# Patient Record
Sex: FEMALE | Race: OTHER
[De-identification: names, ages, dates, MRNs, and addresses within clinical notes are randomized per-mention and may not be internally consistent; named-entity substitution may affect disease eponyms.]

---

## 2021-03-15 ENCOUNTER — HOSPITAL ENCOUNTER (INPATIENT)
Dept: HOSPITAL 76 - WFO | Age: 31
LOS: 2 days | Discharge: HOME | End: 2021-03-17
Attending: ADVANCED PRACTICE MIDWIFE | Admitting: ADVANCED PRACTICE MIDWIFE
Payer: COMMERCIAL

## 2021-03-15 DIAGNOSIS — Z3A.40: ICD-10-CM

## 2021-03-15 DIAGNOSIS — Z20.822: ICD-10-CM

## 2021-03-15 DIAGNOSIS — B96.20: ICD-10-CM

## 2021-03-15 LAB
BASOPHILS NFR BLD AUTO: 0 10^3/UL (ref 0–0.1)
BASOPHILS NFR BLD AUTO: 0.3 %
CLARITY UR REFRACT.AUTO: CLEAR
EOSINOPHIL # BLD AUTO: 0 10^3/UL (ref 0–0.7)
EOSINOPHIL NFR BLD AUTO: 0.4 %
ERYTHROCYTE [DISTWIDTH] IN BLOOD BY AUTOMATED COUNT: 13 % (ref 12–15)
GLUCOSE UR QL STRIP.AUTO: NEGATIVE MG/DL
HCT VFR BLD AUTO: 35.2 % (ref 37–47)
HGB UR QL STRIP: 11.9 G/DL (ref 12–16)
KETONES UR QL STRIP.AUTO: NEGATIVE MG/DL
LYMPHOCYTES # SPEC AUTO: 1.7 10^3/UL (ref 1.5–3.5)
LYMPHOCYTES NFR BLD AUTO: 23.1 %
MCH RBC QN AUTO: 31.2 PG (ref 27–31)
MCHC RBC AUTO-ENTMCNC: 33.8 G/DL (ref 32–36)
MCV RBC AUTO: 92.1 FL (ref 81–99)
MONOCYTES # BLD AUTO: 0.5 10^3/UL (ref 0–1)
MONOCYTES NFR BLD AUTO: 7 %
MUCOUS THREADS URNS QL MICRO: (no result)
NEUTROPHILS # BLD AUTO: 5.1 10^3/UL (ref 1.5–6.6)
NEUTROPHILS # SNV AUTO: 7.3 X10^3/UL (ref 4.8–10.8)
NEUTROPHILS NFR BLD AUTO: 68.9 %
NITRITE UR QL STRIP.AUTO: NEGATIVE
NRBC # BLD AUTO: 0 /100WBC
NRBC # BLD AUTO: 0 X10^3/UL
PDW BLD AUTO: 10.7 FL (ref 7.9–10.8)
PH UR STRIP.AUTO: 6 PH (ref 5–7.5)
PLATELET # BLD: 243 10^3/UL (ref 130–450)
PROT UR STRIP.AUTO-MCNC: NEGATIVE MG/DL
RBC # UR STRIP.AUTO: (no result) /UL
RBC # URNS HPF: (no result) /HPF (ref 0–5)
RBC MAR: 3.82 10^6/UL (ref 4.2–5.4)
SP GR UR STRIP.AUTO: 1.02 (ref 1–1.03)
SQUAMOUS URNS QL MICRO: (no result)
UROBILINOGEN UR QL STRIP.AUTO: (no result) E.U./DL
UROBILINOGEN UR STRIP.AUTO-MCNC: NEGATIVE MG/DL
WBC # UR MANUAL: (no result) /HPF (ref 0–5)

## 2021-03-15 PROCEDURE — 85025 COMPLETE CBC W/AUTO DIFF WBC: CPT

## 2021-03-15 PROCEDURE — 87635 SARS-COV-2 COVID-19 AMP PRB: CPT

## 2021-03-15 PROCEDURE — 96365 THER/PROPH/DIAG IV INF INIT: CPT

## 2021-03-15 PROCEDURE — 96367 TX/PROPH/DG ADDL SEQ IV INF: CPT

## 2021-03-15 PROCEDURE — 87181 SC STD AGAR DILUTION PER AGT: CPT

## 2021-03-15 PROCEDURE — 81001 URINALYSIS AUTO W/SCOPE: CPT

## 2021-03-15 PROCEDURE — 86900 BLOOD TYPING SEROLOGIC ABO: CPT

## 2021-03-15 PROCEDURE — 86901 BLOOD TYPING SEROLOGIC RH(D): CPT

## 2021-03-15 PROCEDURE — 86850 RBC ANTIBODY SCREEN: CPT

## 2021-03-15 PROCEDURE — 87086 URINE CULTURE/COLONY COUNT: CPT

## 2021-03-15 NOTE — HISTORY & PHYSICAL EXAMINATION
Prenatal Admit History





- Visit Reason


Visit Reason: Other (Elective IOL)





- Pregnancy


: 5


Parity: 3


Premature: 0


Ectopic: 0


: 1


Prenatal Care: positive: LIDA-All, None (Transfer to [facility] at 35.6wks)


Prenatal Risk/History: positive: None


Pregnancy Complications This Pregnancy: positive: Treated for GBS/UTI (UTI (non 

gbs) treated at 36.4wks)


Smoking Status: Never smoker





- Mother's Labs


Mother's Blood Type: positive: O


Mother's RH: positive: Positive


GBS: positive: Group B Step Negative


Rubella Status: positive: Immune





- Other Maternal History


Other Maternal History: 





 31yo  at 40.3wks gestation who presents to labor and delivery with 

desire for pre-induction cervical ripening. 


 Contractions none


 Reports fetal movement


 Denies VB/LOF


 Does report mild CVA tenderness on her right side, with a history of UTI 

treated with macrobid at 36.4wks. Denies other s/s UTI


 Prenatal Care- with [facility] to 35.6 weeks then transferred to [facility]


 Pregnancy complications


    None


 Dating Criteria 


    Initial U/S: 11.0wks c/w LMP for COLT 2021


 OB Hx 


    G1: . . 39wks. Male 7#


    G2: . . 40wks. Female 7#


    G3: SAB 6wks


    G4: . . 39wks. Male 6#


 Medications


    PNV


 Allergies


    NKA


 Medical History


    None


 Surgical History


    [place] Teeth Extraction


 Family History


    Non contributory


 Social History


    Non contributory


 Prenatal Labs


Initial U/S:  at [facility] at 11.0wks c/w LMP for final COLT 2021


O pos/Rubella  immune


VZV immune


Gentic testing: not done, in [facility] chart as "too late to draw"


FAS: Anterior placenta. ELLIE wnl, efw 43%


f/u cardiac-wnl


Glucola: 103 


Flu:  


TDAP  : 2021


GBS @ 36.5wks NEG


HSV:  denies self and partner


Breast pump Rx : 2020 at [facility]


MOD: .  James. Baby BOY: [employer]. has 3 NSVDs, first tear, rest 

intact. .


pp contraception:DESIRES BTL- consent in chart


PAP: 08/21/20-nilm





 SVE  on admit /-2/post/mod


 Vertex by digital exam and Leopold's


 EFW by Leopold's- 7.5#


 UA- collected and sent








 Assessment


    31yo  at 40.3wks gestation presents for elective IOL


    Uterine activity- none


    Robertson score 4 -requires cervical ripening


    FHT Cat I


 Plan


    Admit to Labor and Delivery- observation status


    Cervical ripening with Misoprostol q4h


    Antibiotic therapy if indicated for UTI


    Monitoring- Continuous


    Comfort measures available- whirlpool tub, fentanyl, epidural, N2O


    Diet/Activity- per pt preference


    Anticipate 











Review of Systems





- Genitourinary


Genitourinary: reports: Flank pain (mild tenderness)





- All Other Systems


All Other Systems: reports: Reviewed and negative





Physical





- Abdominal Exam


Contraction Frequency (min/apart): mild to none


Contraction Intensity: positive: Mild


Uterine Resting Tone: positive: Soft





- Fetal Monitoring


Fetal Strip Review: positive: Category I





- Presentation


Presentation: positive: Vertex





- Vaginal Exam


Membranes: positive: Membranes intact


Dilation (in cm): 2


Effacement (%): 50


Station: positive: -2


Cervical Position: positive: Posterior





Plan for Labor





- Plan For Labor


I expect patient to be DC'd or transferred within 96 hours.: Yes Laceration to left finger from kitchen knife today.

## 2021-03-15 NOTE — ANESTHESIA
Pre-Anesthesia VS, & Labs





- Diagnosis





active labor





- Procedure





labor epidural


Vital Signs: 





                                        











Temp Pulse Resp BP Pulse Ox


 


 36.8 C   88   16   130/83 H   


 


 03/15/21 10:53  03/15/21 10:53  03/15/21 10:53  03/15/21 10:53   











Height: 5 ft 3 in


Weight (kg): 62.414 kg


Body Mass Index: 24.3


BMI Classification: Healthy weight





- Pregnancy


Is Patient Pregnant?: Yes





- Lab Results


Current Lab Results: 





Laboratory Tests





03/15/21 13:00: Blood Type O POSITIVE, Antibody Screen NEGATIVE


03/15/21 13:00: WBC 7.3, RBC 3.82 L, Hgb 11.9 L, Hct 35.2 L, MCV 92.1, MCH 31.2 

H, MCHC 33.8, RDW 13.0, Plt Count 243, MPV 10.7, Neut # (Auto) 5.1, Lymph # 

(Auto) 1.7, Mono # (Auto) 0.5, Eos # (Auto) 0.0, Baso # (Auto) 0.0, Absolute 

Nucleated RBC 0.00, Nucleated RBC % 0.0








Fish Bones: 


                                 03/15/21 13:00








Home Medications and Allergies





Active Medications





Acetaminophen (Acetaminophen 325 Mg Tablet)  650 mg PO Q6H PRN


   PRN Reason: Pain or Fever


Carboprost Tromethamine (Carboprost Tromethamine 250 Mcg/Ml Amp)  250 mcg IM 

Q15M PRN


   PRN Reason: Step 4: Hemorrhage protocol


   Stop: 21 13:22


Cephalexin (Cephalexin 250 Mg Capsule)  500 mg PO Q6HR LILA


Fentanyl (Fentanyl 100 Mcg/2 Ml Vial)  50 mcg IVP Q1H PRN


   PRN Reason: PAIN


Lactated Ringer's (Lr)  1,000 mls @ 100 mls/hr IV .Q10H LILA


   Last Infusion: 03/15/21 17:56 Dose:  0 mls/hr


   Documented by: 


Oxytocin/Sodium Chloride (Pitocin/Sodium Chloride)  500 mls @ 999 mls/hr IV PRN 

PRN; Protocol


   PRN Reason: POST-PARTUM HEMORR PREVENTION


   Stop: 21 13:22


   Last Admin: 03/15/21 18:37 Dose:  1 milliunit/min, 1 mls/hr


   Documented by: 


Tranexamic Acid (Tranexamic 1,000 Mg/100ml-Nacl)  1,000 mg in 100 mls @ 600 

mls/hr IV .ONCE PRN


   PRN Reason: EBL >1200mL and within 3hr


   Stop: 21 13:22


Oxytocin/Sodium Chloride (Pitocin/Sodium Chloride)  500 mls @ 1 mls/hr IV TITR 

LILA; Protocol


Lidocaine HCl (Lidocaine-Mpf 1% 30 Ml Vial)  30 ml ID .ONCE PRN


   PRN Reason: PERINEAL REPAIR


   Stop: 21 13:22


Methylergonovine Maleate (Methylergonovine 0.2 Mg/Ml Vial)  0.2 mg IM .ONCE PRN


   PRN Reason: Step 2: Hemorrhage protocol


   Stop: 21 13:22


Metoclopramide HCl (Metoclopramide 10 Mg Tablet)  10 mg PO Q6H PRN


   PRN Reason: Nausea / Vomiting


Misoprostol (Misoprostol 200 Mcg Tablet)  800 mcg BC .ONCE PRN


   PRN Reason: Step 3: Hemorrhage protocol


   Stop: 21 13:22


Misoprostol (Misoprostol 100 Mcg Tablet)  50 mcg BC Q4HR LILA


   Last Admin: 03/15/21 18:22 Dose:  Not Given


   Documented by: 


Ondansetron HCl (Ondansetron 4 Mg/2 Ml Vial)  4 mg IVP Q4HR PRN


   PRN Reason: Nausea / Vomiting


Oxytocin (Oxytocin 10 Unit/Ml Vial)  10 unit IM .ONCE PRN


   PRN Reason: Step one: If no IV access


   Stop: 21 13:22


Promethazine HCl (Promethazine 25 Mg Tablet)  25 mg PO Q6H PRN


   PRN Reason: Nausea / Vomiting


Sodium Chloride (Sodium Chloride Flush 0.9% 10 Ml Syringe)  10 ml IVP 

0100,0900,1700 LILA


Sodium Chloride (Sodium Chloride Flush 0.9% 10 Ml Syringe)  10 ml IVP PRN PRN


   PRN Reason: AS NEEDED PER PROVIDER ORDERS








Allergies/Adverse Reactions: 


                                    Allergies











Allergy/AdvReac Type Severity Reaction Status Date / Time


 


No Known Drug Allergies Allergy   Verified 03/15/21 18:21














Anes History & Medical History





- Anesthetic History


Anesthesia Complications: reports: No previous complications


Family history of Anesthesia Complications: Denies


Family history of Malignant Hyperthermia: Denies





- Medical History


Smoking Status: Never smoker





- Obstetrical History


: 5


Parity: 3


Prenatal Events: positive: None


Pregnancy Complications: positive: Treated for GBS/UTI (UTI (non gbs) treated at

36.4wks)





Exam


General: Alert, Oriented x3, Cooperative


Dental: WNL


Mouth Opening: 3 Fingerbreadth


Neck Mobility: Normal


Mallampati classification: I





Plan


Anesthesia Type: Epidural


Consent for Procedure(s) Verified and Reviewed: Yes


Code Status: Attempt Resuscitation


ASA classification: 2-Mild systemic disease


Is this case an emergency?: No

## 2021-03-15 NOTE — PROVIDER PROGRESS NOTE
Subjective





- Subjective


Subjective: 





Called as I was on the floor and VICKIE was out of the facility for a decel.  

Lasted 7min, amy 70, resolved with position changes.  SVE unchanged at 2cm.  

Ballotable. Was getting ready to give terbutaline when the FHTs normalized.  P3 

[facility] patient at 40w undergoing elective IOL, misoprostol given 4h prior, feeling 

some cramping but no significant labor contractions.  Tracing prior to decel was

category 1.  Tracing after was as well.  Likely a period of cord compression 

resolved with position changes.  Will continue the continuous monitoring.  No 

further miso will be given.  Report given to VICKIE Felix on her arrival.  





Objective





- Vital Signs/Intake & Output


Vital Signs: 


                                Vital Signs x48h











  Temp Pulse Resp BP


 


 03/15/21 10:53  98.2 F  88  16  130/83 H











Intake & Output: 


                                 Intake & Output











 03/12/21 03/13/21 03/14/21 03/15/21





 22:59 22:59 23:59 23:59


 


Intake Total    166.667


 


Balance    166.667














- Lab Results


Fish Bones: 


                                 03/15/21 13:00





Other Labs: 


                               Lab Results x24hrs











  03/15/21 03/15/21 03/15/21 Range/Units





  15:00 13:00 13:00 


 


WBC    7.3  (4.8-10.8)  x10^3/uL


 


RBC    3.82 L  (4.20-5.40)  10^6/uL


 


Hgb    11.9 L  (12.0-16.0)  g/dL


 


Hct    35.2 L  (37.0-47.0)  %


 


MCV    92.1  (81.0-99.0)  fL


 


MCH    31.2 H  (27.0-31.0)  pg


 


MCHC    33.8  (32.0-36.0)  g/dL


 


RDW    13.0  (12.0-15.0)  %


 


Plt Count    243  (130-450)  10^3/uL


 


MPV    10.7  (7.9-10.8)  fL


 


Neut # (Auto)    5.1  (1.5-6.6)  10^3/uL


 


Lymph # (Auto)    1.7  (1.5-3.5)  10^3/uL


 


Mono # (Auto)    0.5  (0.0-1.0)  10^3/uL


 


Eos # (Auto)    0.0  (0.0-0.7)  10^3/uL


 


Baso # (Auto)    0.0  (0.0-0.1)  10^3/uL


 


Absolute Nucleated RBC    0.00  x10^3/uL


 


Nucleated RBC %    0.0  /100WBC


 


Urine Color  YELLOW    


 


Urine Clarity  CLEAR    (CLEAR)  


 


Urine pH  6.0    (5.0-7.5)  PH


 


Ur Specific Gravity  1.020    (1.002-1.030)  


 


Urine Protein  NEGATIVE    (NEGATIVE)  mg/dL


 


Urine Glucose (UA)  NEGATIVE    (NEGATIVE)  mg/dL


 


Urine Ketones  NEGATIVE    (NEGATIVE)  mg/dL


 


Urine Occult Blood  TRACE-INTA    (NEGATIVE)  


 


Urine Nitrite  NEGATIVE    (NEGATIVE)  


 


Urine Bilirubin  NEGATIVE    (NEGATIVE)  


 


Urine Urobilinogen  0.2 (NORMAL)    (NORMAL)  E.U./dL


 


Ur Leukocyte Esterase  NEGATIVE    (NEGATIVE)  


 


Urine RBC  0-5    (0-5)  /HPF


 


Urine WBC  0-3    (0-5)  /HPF


 


Ur Squamous Epith Cells  FEW Squamous    (<= Few)  


 


Urine Bacteria  Moderate H    (None Seen)  /HPF


 


Urine Mucus  Few Strands    


 


Urine Culture Comments  INDICATED    


 


Blood Type   O POSITIVE   


 


Antibody Screen   NEGATIVE

## 2021-03-16 PROCEDURE — 10907ZC DRAINAGE OF AMNIOTIC FLUID, THERAPEUTIC FROM PRODUCTS OF CONCEPTION, VIA NATURAL OR ARTIFICIAL OPENING: ICD-10-PCS | Performed by: ADVANCED PRACTICE MIDWIFE

## 2021-03-16 RX ADMIN — IBUPROFEN SCH MG: 800 TABLET, FILM COATED ORAL at 18:56

## 2021-03-16 RX ADMIN — ACETAMINOPHEN PRN MG: 500 TABLET ORAL at 19:56

## 2021-03-16 RX ADMIN — ACETAMINOPHEN PRN MG: 500 TABLET ORAL at 12:09

## 2021-03-16 RX ADMIN — DOCUSATE SODIUM SCH MG: 100 CAPSULE, LIQUID FILLED ORAL at 21:01

## 2021-03-16 RX ADMIN — ONDANSETRON PRN MG: 2 INJECTION INTRAMUSCULAR; INTRAVENOUS at 03:07

## 2021-03-16 RX ADMIN — IBUPROFEN SCH MG: 800 TABLET, FILM COATED ORAL at 10:51

## 2021-03-16 RX ADMIN — ONDANSETRON PRN MG: 2 INJECTION INTRAMUSCULAR; INTRAVENOUS at 07:21

## 2021-03-16 NOTE — PROVIDER PROGRESS NOTE
Labor Progress Note





- Uterine Monitoring


Uterine Monitoring Mode: positive: External toco


Contraction Frequency (min/apart): 4-5


Contraction Intensity: positive: Moderate to strong


Uterine Resting Tone: positive: Soft





- Fetal Monitoring


Fetal Monitor Mode: positive: External ultrasound


Fetal Heart Rate Baseline: 115


Fetal Heart Rate Variability: positive: Moderate (6-25 bmp)


Fetal Accelerations: positive: Present, 15x15


Fetal Decelerations: positive: None





- Vaginal Exam


Dilation (in cm): 6


Effacement (%): 80


Station: -2


Cervical Position: Midposition





- Labor Progress Note


Labor Progress Note/Additional Text: 





S: Comfortable with epidural. Feeling ready to have a baby.


O: FHR baseline 115, moderate variability, + accels, no decels at present


SVE 6/80/-2, midposition, soft. Vertex


AROM at 0613 for moderate amount of clear fluid


A: 29yo  @ 40.4wks gestation


GBS neg


Keflex PO q 6hr for UTI


FHR Category I


P: Continuous fetal monitoring


Maintain epidural for pain management


Anticipate

## 2021-03-16 NOTE — PROVIDER PROGRESS NOTE
Labor Progress Note





- Uterine Monitoring


Uterine Monitoring Mode: positive: External toco


Contraction Frequency (min/apart): 2-4


Contraction Intensity: positive: Mild to moderate





- Fetal Monitoring


Fetal Monitor Mode: positive: External ultrasound


Fetal Heart Rate Baseline: 120


Fetal Heart Rate Variability: positive: Moderate (6-25 bmp)


Fetal Accelerations: positive: Present, 15x15


Fetal Decelerations: positive: None


Fetal Strip Review: positive: Category I





- Vaginal Exam


Dilation (in cm): 4


Effacement (%): 80


Station: -2


Cervical Position: Anterior





- Labor Progress Note


Labor Progress Note/Additional Text: 





S:


Donna is resting in bed in the throne position,  supportive at bedside.

She reports a mild headache and mild nausea, which the nurse has administered 

Zofran for. 





O:


After epidural placement at approximately 2330, fetal heart tones began to 

demonstrate subtle and intermittent late decelerations that progressed over the 

next hour to be recurrent late decelerations. Fluid bolus and significant 

position changes utilized. 


SVE at that time was unchanged.


Pitocin was halved, and then turned off. 


Plan of care discussed with patient was to rest for two hours without pitocin 

and then reevaluate.





SVE at approx 0315 was 4/80/-2/soft/ant/intact with bloody show. 


Fetal head no longer well applied to cervix and not a candidate for AROM.





Ancef 2gx1 given, Keflex PO started at approx midnight.





A:


31yo  at 40.4wks gestation with elective IOL


Cat I strip currently


Fetal head not consistently applied to cervix- position changes indicated, 

possible asynclitism.


Uterine contractions- adequate for cervical change


Epidural effective for pain management





P:


Continuous fetal monitoring


Tylenol for HA


Continue Keflex PO Q6h for UTI- awaiting cultures


Observe labor progress and check for cervical change in four hours or sooner if 

indicated


Anticipate

## 2021-03-16 NOTE — DELIVERY NOTE
Delivery Note





- Labor


Labor: positive: Augmented by ARM, Induced by oxytocin





- Infant Delivery Method


Infant Delivery Method: positive: Spontaneous vaginal delivery





- Birth Presentation


Birth Presentation: positive: Vertex, RONIT - right occiput anterior





- Nuchal Cord


Nuchal Cord: positive: Present, Reduced





- Amniotic Fluid Description


Amniotic Fluid Description: positive: Clear





- Episiotomy Type


Episiotomy Type: positive: None





- Laceration


Laceration: positive: None





- Delivery Outcome


Delivery Outcome: positive: Livebirth





- 


[place]: positive: Placed in direct skin contact with mother, Stimulated, 

Warmed, [place] used


 sex: positive: Male





- Cord


Cord: positive: 3 vessels





- Placenta


Placenta: positive: Intact





- Estimated Blood Loss


Estimated Blood Loss (in cc): 200





- Post Delivery Events


Post Delivery Events: positive: No post delivery events





- Delivery Comments (Free Text/Narrative)


Delivery Comments (Free Text/Narrative): 





Labor: This 29yo  @ 40.4wks gestation presented to [facility] on 03/15/2021 

for elective IOL. Upon arrival cervix was 2/50/-2 and vertex. She was given 

pitocin via IV for IOL. Epidural placed per maternal request. She experienced 

recurrent late decelerations and pitocin was turned off. Decelerations resolved 

with fluid bolus and position changes. Patient progressed to 6/80/-2 

spontaneously and AROM occurred at 0613 and was noted to be a moderate amount of

clear fluid. Normal labor course. Patient progressed to c/c/0 at 0857 with onset

of active pushing at 0910.


Birth: Normal  of a viable male infant on 2021 @ 1936. Nuchal cord x 1 

easily reduced. Body cord x 1 delivered through. The  was placed on 

maternal abdomen, stimulated, dried, and placed skin to skin. Apgar's were 8/9 

at 1 and 5 min respectively. Pitocin administered via IV for hemostasis. The 

umbilical cord was allowed to stop pulsating at which time it was doubly clamped

by CNM and cut by FOB. 3VC. Cord blood was obtained. Fundal massage and gentle 

cord traction applied for active management of the third stage. Placenta 

delivered spontaneously and intact @ 0940. [id number]. 


Fourth stage: Uterine fundus firm and there is no excessive bleeding. The 

perineum, vagina, and cervix were inspected and noted to be intact. 

Breastfeeding initiated. Family bonding well. Both mother and baby were left in 

stable condition.

## 2021-03-17 VITALS — DIASTOLIC BLOOD PRESSURE: 67 MMHG | SYSTOLIC BLOOD PRESSURE: 116 MMHG

## 2021-03-17 RX ADMIN — DOCUSATE SODIUM SCH MG: 100 CAPSULE, LIQUID FILLED ORAL at 08:41

## 2021-03-17 RX ADMIN — ACETAMINOPHEN PRN MG: 500 TABLET ORAL at 03:59

## 2021-03-17 RX ADMIN — IBUPROFEN SCH MG: 800 TABLET, FILM COATED ORAL at 00:59

## 2021-03-17 RX ADMIN — IBUPROFEN SCH MG: 800 TABLET, FILM COATED ORAL at 06:50

## 2021-03-17 NOTE — DISCHARGE PLAN
Discharge Plan


Problem Reviewed?: Yes


Disposition: 01 Home, Self Care


Condition: Good


Diet: Regular


No Smoking: If you smoke, Please STOP!  Call [phone] for help.


Follow-up with: 


Clover Washington ARNP [Provider Admit Priv/Credential] -

## 2021-03-17 NOTE — LABOR FLOWSHEET
===================================

Labor Flowsheet

===================================

Datetime Report Generated by CPN: 03/17/2021 11:19

   

   

===========================

Datetime: 03/17/2021 08:34

===========================

   

   

===================================

VITAL SIGNS

===================================

   

 NBP Sys/Temi/Mean (mmHg):  117

:  67

:  79

 Pulse:  67

 LaborFlag:  Labor

   

===========================

Datetime: 03/17/2021 04:04

===========================

   

 SpO2 (%):  99

   

===========================

Datetime: 03/16/2021 10:00

===========================

   

   

===================================

PAIN

===================================

   

 Pain Scale:  0

   

===========================

Datetime: 03/16/2021 09:35

===========================

   

   

===================================

FETAL ASSESSMENT A

===================================

   

 Monitor Mode:  External US

 FHR Baseline Changes:  No Baseline Change

 Variability:  Moderate 6-25 bpm

 Accelerations:  15X15

 Decelerations:  Early; Late; Variable

 Category:  Category II

   

===========================

Datetime: 03/16/2021 09:06

===========================

   

 Patient Care Comments:  O2 applied per pt. request.  A. Bayron aware and ok with pt. getting 10L via m
ask.

   

===================================

COMMUNICATION

===================================

   

 Communication:  Provider at Bedside

   

===========================

Datetime: 03/16/2021 09:00

===========================

   

   

===================================

UTERINE ACTIVITY

===================================

   

 Monitor Mode:  External

 Frequency (min):  2-5

 Quality:  Moderate

 Duration (sec):  

 Pattern:  Normal: <= 5 Contractions in 10 Minutes

 Resting Tone (Palpate):  Relaxed

 Comments:  sterile exam

   

===========================

Datetime: 03/16/2021 08:57

===========================

   

 Pain Presence:  None/Denies

   

===================================

VAGINAL EXAM

===================================

   

 Dilatation (cm):  10.0

 Effacement (%):  100

 Station:  1

 Exam by:  cgambs

 Anesthesia Level Check:  T9

   

===========================

Datetime: 03/16/2021 08:02

===========================

   

 Actions for Fetal Decelerations:  Other (Annotations: position change)

   

===========================

Datetime: 03/16/2021 07:53

===========================

   

 Patient Position/Activity:  High Fowlers

 Hygiene:  Pushpa Care; Linens Changed

   

===========================

Datetime: 03/16/2021 07:40

===========================

   

 Medication Comments:  Ropivicaine hung

   

===========================

Datetime: 03/16/2021 07:29

===========================

   

 Temperature (C):  38.3

   

===========================

Datetime: 03/16/2021 07:21

===========================

   

 Antiemetics/Antacids:  Zofran (mg) @ 4mg

   

===========================

Datetime: 03/16/2021 07:00

===========================

   

 FHR Baseline Rate :  110

   

===========================

Datetime: 03/16/2021 06:12

===========================

   

 Membrane Status:  Ruptured

 Membranes Rupture Method:  Artificial

 Amniotic Fluid Color:  Clear

 Amniotic Fluid Amount:  Small

 Amniotic Fluid Odor:  Normal

 Vaginal Bleeding:  Normal Show

 Cervix, Position:  Midposition

   

===========================

Datetime: 03/16/2021 03:20

===========================

   

 Analgesics/Sedatives:  Tylenol (mg) @ 650

   

===========================

Datetime: 03/16/2021 03:13

===========================

   

 Cervix, Consistency:  Soft

   

===========================

Datetime: 03/16/2021 02:00

===========================

   

   

===================================

MATERNAL ASSESSMENT

===================================

   

 Level of Consciousness:  Drowsy

 Headache:  Denies

 Nausea/Vomiting:  Denies

   

===================================

PATIENT CARE

===================================

   

 IV/Blood Work:  New IV Bag Hung

   

===========================

Datetime: 03/16/2021 01:12

===========================

   

 Communication Comments:  Per Clover [place], CNMW will remain off pitocin for 2hours then recheck 
SVE

   

===========================

Datetime: 03/16/2021 00:54

===========================

   

   

===================================

MEDICATIONS

===================================

   

 Pitocin (milliunits):  Discontinued

   

===========================

Datetime: 03/16/2021 00:15

===========================

   

 Monitor Interventions for UA:  [place] Adjusted

 Pitocin Checklist:  At Least 1 Acceleration of 15 bpm x 15 Seconds in 30 Minutes or Adequate Variabi
lity; No More than 1 Late Deceleration Occurred in Past 30 Minutes; No More than 5 Uterine Contractio
ns in 10 Minutes for any 20 Minute Interval; Uterus Palpates Soft between Contractions

 Monitor Interventions for FHR:  Ultrasound Adjusted

   

===========================

Datetime: 03/16/2021 00:12

===========================

   

 Vaginal Exam Comments:  Unchanged from previous SVE

 I/O Interventions:  Ott Cath Inserted

   

===========================

Datetime: 03/15/2021 23:15

===========================

   

   

===================================

ANESTHESIA

===================================

   

 Anesthesia Plans:  Epidural

 Epidural Procedure:  Completed

   

===========================

Datetime: 03/15/2021 23:03

===========================

   

 Epidural Positioning:  Sitting

   

===========================

Datetime: 03/15/2021 22:20

===========================

   

   

===================================

PROCEDURE TIME OUT

===================================

   

 Procedure Verify:  Correct Patient Identity; Correct Side and Site are Marked; Accurate Procedure Co
nsent Form; Agreement on Procedure to be Done; Correct Patient Position; Addressed Need to Administer
 Antibiotics or Fluids for Irrigation; Safety Precautions Based on Patient History or Medication Use

 Anesthesia Comments:  Marixa Kumar in room for epidural placement. RN at bedside for pt positionin
g

   

===========================

Datetime: 03/15/2021 22:15

===========================

   

 Contraction Comments:  ctx coupling

   

===========================

Datetime: 03/15/2021 22:06

===========================

   

 Notification Reason:  Labor Status

   

===========================

Datetime: 03/15/2021 21:56

===========================

   

 Pain Assessment Comments:  Pt having increased back/pelvic pain with ctx and is requesting epidural

   

===========================

Datetime: 03/15/2021 21:20

===========================

   

 Pain Type:  Cramping; Contraction

   

===========================

Datetime: 03/15/2021 19:18

===========================

   

 Breath Sounds, Left:  Clear and Equal

 Breath Sounds, Right:  Clear and Equal

 RUQ Epigastric Pain:  Denies

   

===================================

TEACHING

===================================

   

 Instructional Method:  Verbal

 Plan of Care:  Plan of Care Discussed

   

===========================

Datetime: 03/15/2021 19:01

===========================

   

 Oxygen Method:  Room Air

   

===========================

Datetime: 03/15/2021 17:48

===========================

   

 Respirations:  20

   

===========================

Datetime: 03/15/2021 17:23

===========================

   

 Provider Notified (Name):  Clover [place] CNM

   

===========================

Datetime: 03/15/2021 16:50

===========================

   

 Antibiotics:  Ancef IV (Gm) @ 2

   

===========================

Datetime: 03/15/2021 13:41

===========================

   

 Cervical Ripening Agents:  Cytotec @ 50 buccal

## 2021-03-17 NOTE — DISCHARGE SUMMARY
Discharge Summary


Condition at Discharge: Good





- HPI


History of Present Illness: 





Admit Date 03/15/2021


Discharge Date 2021


Diagnosis on Admission:


1.   A 29yo  at 40.3 week intrauterine pregnancy


2.   UTI- asymptomatic, positive UA





Diagnosis on Discharge


1.   A 29yo  s/p spontaneous vaginal delivery on 2021


2.   Normal recovery


3.   UTI- culture positive for E. coli; treatment will continue outpatient





Brief History:


She is a patient at [facility] who presented on 03/15/2021 for 

pre-induction cervical ripening for elective induction of labor.  Her cervix was

2cm dilated, 50%effaced, and -2 station. She was ripening with misoprostol and 

was augmented with pitocin and spontaneously delivered a viable male infant 

named , weighing 7# 11oz. Apgars were 8 and 9- and 1 and 5 minutes 

respectively.  mL. The patients perineum was intact.





She has been doing well in her postpartum course. She is ambulating and 

tolerating a regular diet. She is urinating without difficulty and her lochia is

normal. Her pain is well controlled with oral medications.  She will be 

discharged home today on postpartum day #1 prescription for Keflex sent elect

ronically to [facility] per pt request. She intends to follow up with 

Midwifery at [facility] in 1 and 6 weeks for routine postpartum

visit. She has been given precautions to call if she has any worsening fevers, 

chills, abdominal pain, increased bleeding or foul smelling vaginal lochia.








- ALLERGIES


Allergies/Adverse Reactions: 


                                    Allergies











Allergy/AdvReac Type Severity Reaction Status Date / Time


 


No Known Drug Allergies Allergy   Verified 03/15/21 18:21














- LABS


Result Diagrams: 


                                 03/15/21 13:00

## 2021-03-17 NOTE — PROVIDER PROGRESS NOTE
Subjective





- Prog Note Date


Prog Note Date: 21


Prog Note Time: 10:34





- Subjective


Pt reports feeling: Improved


Subjective: 





S:


Donna is resting in bed and holding baby while he gets his Metabolic Screen 

drawn. FOB is supportive at bedside. She reports breastfeeding has been easy, 

much easier to latch that her previous three children. 


She reports her bleeding as about 1/3-1/2 a pad every 2-3 hours when she gets up

to urinate.


Her cramping has been well controlled with PO pain meds.


She strongly desires to go home today





O:


Lochia rubra


Fundus firm





A: 


29yo  s/p  on pp day 1


Normal postpartum recovery





P:


Continue with routine postpartum care


Evaluate for discharge home today


Continue Keflex QID for a total of 7 days








Objective





- Vital Signs/Intake & Output


Vital Signs: 


                                Vital Signs x48h











  Temp Pulse Resp BP Pulse Ox


 


 21 08:36  36.8 C  68  16  116/67  98


 


 21 04:00  36.4 C L  57 L  16  116/66  98











Intake & Output: 


                                 Intake & Output











 03/14/21 03/15/21 03/16/21 03/17/21





 23:59 23:59 23:59 23:59


 


Intake Total  166.667 500 


 


Output Total   1175 


 


Balance  166.667 -675 














- Lab Results


Fish Bones: 


                                 03/15/21 13:00

## 2021-04-02 ENCOUNTER — HOSPITAL ENCOUNTER (OUTPATIENT)
Dept: HOSPITAL 76 - LAB.R | Age: 31
Discharge: HOME | End: 2021-04-02
Attending: ADVANCED PRACTICE MIDWIFE
Payer: COMMERCIAL

## 2021-04-02 DIAGNOSIS — N39.0: Primary | ICD-10-CM

## 2021-04-02 LAB
CLARITY UR REFRACT.AUTO: CLEAR
GLUCOSE UR QL STRIP.AUTO: NEGATIVE MG/DL
KETONES UR QL STRIP.AUTO: NEGATIVE MG/DL
NITRITE UR QL STRIP.AUTO: NEGATIVE
PH UR STRIP.AUTO: 5.5 PH (ref 5–7.5)
PROT UR STRIP.AUTO-MCNC: NEGATIVE MG/DL
RBC # UR STRIP.AUTO: (no result) /UL
RBC # URNS HPF: (no result) /HPF (ref 0–5)
SP GR UR STRIP.AUTO: 1.02 (ref 1–1.03)
SQUAMOUS URNS QL MICRO: (no result)
UROBILINOGEN UR QL STRIP.AUTO: (no result) E.U./DL
UROBILINOGEN UR STRIP.AUTO-MCNC: NEGATIVE MG/DL
WBC # UR MANUAL: (no result) /HPF (ref 0–5)

## 2021-04-02 PROCEDURE — 81001 URINALYSIS AUTO W/SCOPE: CPT

## 2021-04-02 PROCEDURE — 87086 URINE CULTURE/COLONY COUNT: CPT

## 2021-05-21 ENCOUNTER — HOSPITAL ENCOUNTER (OUTPATIENT)
Dept: HOSPITAL 76 - LAB | Age: 31
Discharge: HOME | End: 2021-05-21
Attending: OBSTETRICS & GYNECOLOGY
Payer: COMMERCIAL

## 2021-05-21 DIAGNOSIS — Z01.812: Primary | ICD-10-CM

## 2021-05-21 DIAGNOSIS — Z20.822: ICD-10-CM

## 2021-05-21 LAB
BASOPHILS NFR BLD AUTO: 0 10^3/UL (ref 0–0.1)
BASOPHILS NFR BLD AUTO: 0.4 %
EOSINOPHIL # BLD AUTO: 0 10^3/UL (ref 0–0.7)
EOSINOPHIL NFR BLD AUTO: 0.5 %
ERYTHROCYTE [DISTWIDTH] IN BLOOD BY AUTOMATED COUNT: 12.3 % (ref 12–15)
HCT VFR BLD AUTO: 38.6 % (ref 37–47)
HGB UR QL STRIP: 12.7 G/DL (ref 12–16)
LYMPHOCYTES # SPEC AUTO: 2.3 10^3/UL (ref 1.5–3.5)
LYMPHOCYTES NFR BLD AUTO: 42.2 %
MCH RBC QN AUTO: 29.7 PG (ref 27–31)
MCHC RBC AUTO-ENTMCNC: 32.9 G/DL (ref 32–36)
MCV RBC AUTO: 90.4 FL (ref 81–99)
MONOCYTES # BLD AUTO: 0.3 10^3/UL (ref 0–1)
MONOCYTES NFR BLD AUTO: 6.1 %
NEUTROPHILS # BLD AUTO: 2.8 10^3/UL (ref 1.5–6.6)
NEUTROPHILS # SNV AUTO: 5.5 X10^3/UL (ref 4.8–10.8)
NEUTROPHILS NFR BLD AUTO: 50.6 %
NRBC # BLD AUTO: 0 /100WBC
NRBC # BLD AUTO: 0 X10^3/UL
PDW BLD AUTO: 9.7 FL (ref 7.9–10.8)
PLATELET # BLD: 267 10^3/UL (ref 130–450)
RBC MAR: 4.27 10^6/UL (ref 4.2–5.4)

## 2021-05-21 PROCEDURE — 36415 COLL VENOUS BLD VENIPUNCTURE: CPT

## 2021-05-21 PROCEDURE — 85025 COMPLETE CBC W/AUTO DIFF WBC: CPT

## 2021-05-24 NOTE — HISTORY & PHYSICAL EXAMINATION
HPI





- History of Present Illness


HPI Comment/Other: 





CC:  tubal ligation


HPI:


Pt is here today for a preop consult for a tubal ligation





...................................................................VANCE Retana   May 13, 2021 12:51 PM





The patient is a 30-year-old G5, P4 here for sterilization/preoperative consult.





The patient is a G5, P4 who has had four vaginal deliveries. She recently had a 

delivery about two months ago. [facility]. She is 

interested in permanent sterilization by laparoscopic salpingectomy. She was 

referred for surgical consult today. She has no significant past medical or 

surgical history. She has no history of STIs. No abnormal Pap smear. She is due 

for next Pap smear in . She has used pill and the patch in the past. She is 

confident that she no longer wants to have further pregnancies. She confirms 

that she signed [facility] consent form more than 30 days ago.








Allergies:  


No Known Allergies





Medications:  


KEFLEX 750 MG ORAL CAPSULE (CEPHALEXIN) Take one capsule by mouth four times 

daily for a total of 7 days. First day was 2021.; Route: ORAL


PRE- FORMULA ORAL TABLET (PRENATAL MULTIVIT-MIN-FE-FA) Take one tablet by 

mouth once a day; Route: ORAL





Problems: 


Sterilization counseling (ICD-V25.09) (JMN68-E13.09)


Urinary tract infection (UTI) (ICD-599.0) (ZTY02-O77.0)


Postpartum care of lactating mother (ICD-V24.1) (GHO84-Q71.1)


Pelvic floor instability (ICD-618.89) (RJR01-D86.89)


Inguinal hernia, right, small (ICD-550.90) (LBD27-V67.90)





Family History Summary:


Family History Reviewed: 2021


Family History of Other Cancer for Paternal Grandfather - Entered On: 2021





[facility] Family History Notes: Denies family history of diabetes. No family 

history of hypertension or cardiovascular disease. History of cancer in maternal

uncle and maternal grandfather, not otherwise specified. Father is in good 

health. No information on maternal history.








Social History Summary:


The patient lives in [place] with her  and four children.


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


T: Non.


E: No alcohol while breastfeeding. Previously, two glasses per week.


D: None.


Safe at home.





Social History Reviewed: 2021


Previous Social History: 








Risk Factors-CCC with prev: 





Smoked Tobacco Use:  Never smoker


Smokeless Tobacco Use:  Never


Passive Smoke Exposure:  no





Alcohol Use:  no





Drug Use:  no














Vital Signs:





Patient Profile:   30 Years Old Female


Height:      63 inches


Weight:      122 pounds


BMI:      21.69


BP sittin / 69


Cuff size:   regular





Vitals Entered By: VANCE Retana  (May 13, 2021 12:51 PM)





Meds Reviewed: Done


Allergies Reviewed: Done


No known allergies: T








Pregnancy Questionnaire 


Would you like to become pregnant in the next year?  No


Are you currently using contraception? Yes


Are you satisfied with your current birth control? No


Education provided to patient?  Yes


Completed by: Stephanie Alejandro MD (May 15, 2021 12:26 PM)


What is your current type of birth control? Female sterilization


End Method: Female sterilization








Past Medical History:


   Reviewed and updated today:


      None





Past Surgical History:


   Reviewed and updated today:


      Noble Teeth


      


      None











GYN Review of Systems 


ROS Comments: As per HPI, otherwise remaining systems are negative.





Physical 





Constitutional: 


No apparent distress.





Head: 


Normocephalic and atraumatic.





Eyes: 


No scleral icterus or conjunctival injection.





Cardiovascular: 


Regular rate and rhythm.





Respiratory: 


Clear to auscultation bilaterally. Normal respiratory effort.





Abdomen: 


Soft, nontender, nondistended.





Extremities: 


Warm and well perfused. No lower extremity edema.





Neurologic: 


Alert and oriented with normal gait and coordination.





Psych: 


Bright and reactive affect.














Impression & Recommendations:





Problem # 1:  Sterilization counseling (ICD-V25.09) (JNR67-V27.09)


I will put the plan for sterilization consultation manually.





Preop examination for laparoscopic bilateral salpingectomy





We discussed risks, benefits, alternatives.





Reviewed all surgical procedures carry risks of bleeding, infection, and damage 

nearby tissue and organs.





Discussed the risk of infection with blood transfusion is relatively low.  Risk 

of HIV is 1 in 2 million nationwide, risk of hepatitis is 1/million nationwide. 

Reviewed for possibility of transfusion reaction and possible management with 

medications.  She is provided consent for blood transfusion.





Reviewed lower risk procedure for infection and antibiotics are not indicated 

for prophylaxis. 





We discussed the anatomical proximity of other organs near the ovary including 

but not limited to the bladder, ureters, and bowel.  As surgeons, we used a 

number of surgical to techniques to avoid damaging any of these other organs.  

We reviewed, that despite her best efforts, sometimes injury occurs to these 

organs.  We discussed that this may cause complicated post operative course. 








We also discussed the possibility of converting to an open  procedure, although 

risk is low


She is aware that this is an irreversible procedure and will preclude future pre

gnancy in the absence of IVF. 





She provided consent to all of the above.  We will proceed to surgery with a 

scheduled operating room date.


Orders:


PRE OP -[id number] ([id number])








Documented by Rick.




















Gender ID 


Identifies as Female


 P: 4 T: 4 A: 1 


SAB: 1 L: 4 EDC: delivered


EDC by Ultrasound:  2021


Height:  63 (2021 7:55:17 AM)


Weight: 122


Weight (pre-pregnancy):  120 (2021 10:52:53 AM)


Gonnorhea: negative (2020 3:57:30 PM)


Chlamydia: negative (2020 3:57:31 PM)


Group B: NEGATIVE (2021 10:30:00 AM)


US: 22W 0D (2020 4:07:28 PM)


Blood Type: O+ (2020 3:57:22 PM)


RH Type: + (2020 3:57:23 PM)


Last Antibody Screen: negative (2020 3:57:23 PM)


Birth Control Plan(per PISQ): Female sterilization


Gonnorrhea: negative (2020 3:57:30 PM)


Chlamydia: negative (2020 3:57:31 PM)


HIV: negative (2020 3:57:29 PM)


RPR: negative (2020 3:57:27 PM)


Last Pap: Normal, Satisfactory (2020 3:55:04 PM)




















Electronically signed by Stephanie Alejandro MD on 05/15/2021 at 12:26 PM





PMH/PSH





- Past Medical History


Cardiovascular: positive: None


Respiratory: positive: None


Endocrine/Autoimmune: positive: None


GI: positive: None


: positive: Chronic bladder infection


HEENT: positive: Chronic vision loss


Psych: positive: None


Musculoskeletal: positive: None


Derm: positive: None


MRSA Hx?: No





Social & Family Hx





- Social History


Smoking Status: Never smoker





Meds/Allgy





- Home Medications


Home Medications: 


                                Ambulatory Orders











 Medication  Instructions  Recorded  Confirmed


 


No Known Home Medications  21














- Allergies


Allergies/Adverse Reactions: 


                                    Allergies











Allergy/AdvReac Type Severity Reaction Status Date / Time


 


No Known Drug Allergies Allergy   Verified 03/15/21 18:21

## 2021-05-25 ENCOUNTER — HOSPITAL ENCOUNTER (OUTPATIENT)
Dept: HOSPITAL 76 - SDS | Age: 31
Discharge: HOME | End: 2021-05-25
Attending: OBSTETRICS & GYNECOLOGY
Payer: COMMERCIAL

## 2021-05-25 VITALS — SYSTOLIC BLOOD PRESSURE: 105 MMHG | DIASTOLIC BLOOD PRESSURE: 55 MMHG

## 2021-05-25 DIAGNOSIS — H54.7: ICD-10-CM

## 2021-05-25 DIAGNOSIS — Z30.2: Primary | ICD-10-CM

## 2021-05-25 LAB — HCG UR QL: NEGATIVE

## 2021-05-25 PROCEDURE — 58661 LAPAROSCOPY REMOVE ADNEXA: CPT

## 2021-05-25 PROCEDURE — 81025 URINE PREGNANCY TEST: CPT

## 2021-05-25 PROCEDURE — 0UT74ZZ RESECTION OF BILATERAL FALLOPIAN TUBES, PERCUTANEOUS ENDOSCOPIC APPROACH: ICD-10-PCS | Performed by: OBSTETRICS & GYNECOLOGY

## 2021-05-25 NOTE — ANESTHESIA POST OP EVALUATION
Anesthesia Post Eval





- Post Anesthesia Eval


Vitals: 





                                Last Vital Signs











Temp  36.4 C L  05/25/21 12:36


 


Pulse  84   05/25/21 12:36


 


Resp  20   05/25/21 12:36


 


BP  109/68   05/25/21 12:36


 


Pulse Ox  100   05/25/21 12:36











CV Function Including HR & BP: Stable


Pain Control: Satisfactory


Nausea & Vomiting: Negative


Mental Status: Baseline


Respiratory Status: Airway Patent


Hydration Status: Satisfactory


Anesthesia Complications: None

## 2021-05-25 NOTE — OPERATIVE REPORT
Operative Report





- General


Procedure Date: 05/25/21


Planned Procedure: 


Laparoscopic bilateral salpingectomy


Pre-Op Diagnosis: Desires sterilization


Procedure Performed: 





Laparoscopic bilateral salpingectomy


Post Op Diagnosis: Same





- Procedure Note


Primary Surgeon: Celena Alejandro MD


Secondary Surgeon: Freddy Archer MD


Anesthesia Provider: KAITLYN Sands CRNA


Anesthesia Technique: General ET tube


Pathology: 





Bilateral tubal segments


IV Fluids (mL): 900


Estimated Blood Loss (mL): 10


Urine Output (mL): 100 (In and out catheterization at start of procedure)


Indications: 





The patient is a 30-year-old G5, P4 who presents for sterilization procedure. 





The patient is a G5, P4 who has had four vaginal deliveries. She recently had a 

delivery about two months ago. She was followed by [facility]. She is 

interested in permanent sterilization by laparoscopic salpingectomy. She has no 

significant past medical or surgical history. She has no history of STIs. No 

abnormal Pap smear. She is due for next Pap smear in 2023. She has used pill and

the patch in the past. She is confident that she no longer wants to have further

pregnancies. She confirms that she signed [facility] consent form more than 30 days 

ago.


Findings: 





Normal appearing uterus, fallopian tubes, and ovaries. Normal appearing liver 

edge and distended but otherwise normal gallbladder. Otherwise normal survey of 

the abdomen and pelvis. Right ureteral vermiculations noted. 


Complications: 





none





- Other


Other Information/Narrative: 





Risks benefits and alternatives of the procedure were reviewed.  Consent was 

again confirmed.  Patient was brought to the operating room and underwent 

general anesthesia. She was placed in dorsal lithotomy position with legs 

resting in yellowfin stirrups.  SCDs were in place and activated.  Patient was 

prepped and draped in the usual sterile fashion. Surgical timeout was performed.

 Bimanual exam was performed.  Sterile speculum was placed. The cervix was 

visualized. The Humi uterine manipulator was placed. 





The base of the umbilicus was anesthetized with intradermal injection of 0.5% 

bupivicaine with epinephrine. A 5 mm skin incision was made with a scalpel.  A 5

mm blunt trocar was inserted under direct visualization using Visiport.  Once 

the port was confirmed to be placed intraperitoneally, the abdomen was 

insufflated to 15 mmHg with CO2 gas   Exploration of the abdomen and pelvis was 

confirmed that no injury was sustained with placement of the trocar.  Two 

additional 5 mm ports were placed in the right and left lower quadrants,  taking

care to avoid the epigastric arteries, while under direct visualization via 

laparoscopic guidance.  The abdomen was explored with the laparoscope, with 

findings as noted.





 The left fallopian tube was grasped and elevated at the fimbriated end. The 

underlying mesosalpinx was sealed and resected to the insertion point on the 

uterine cornua using the Ligasure device.  The tube was then sealed and 

transected at the insertion point at the cornua. The tube was removed from the 

pelvis through the lateral port. The procedure was repeated on the right side. 

Good hemostasis was noted. 





The abdomen was partially desufflated.  Pedicles were observed under decreased 

pressure and good hemostasis was again confirmed. Abdomen was then completely 

desufflated. All instruments were removed from the abdomen.  Skin was closed 

with interrupted subcuticular stitches using 4-0 Monocryl.  Dermabond was 

applied over the suture sites.  





The Humi manipulator was removed from the uterus. Again, good hemostasis was 

noted. 





The final sponge needle and instrument counts were correct at completion of the 

procedure patient was awakened taken to the postanesthesia care unit in stable 

condition.





 assisted with resection of portions of the right tube and with 

retraction.

## 2021-05-25 NOTE — ANESTHESIA
Pre-Anesthesia VS, & Labs





- Diagnosis





desires sterilization





- Procedure





laparoscopic salpingectomy


Vital Signs: 





                                        











Temp Pulse Resp BP Pulse Ox


 


 36.1 C L  66   20   121/84 H  99 


 


 05/25/21 06:50  05/25/21 06:50  05/25/21 06:50  05/25/21 06:50  05/25/21 06:50











Height: 5 ft 3 in


Weight (kg): 53 kg


Body Mass Index: 20.7


BMI Classification: Healthy weight





- NPO


>8 hours





- Pregnancy


Is Patient Pregnant?: No (breastfeeding currently)





- Lab Results


Lab results reviewed: Yes





Home Medications and Allergies


Home Medications: 


Ambulatory Orders





No Known Home Medications  05/17/21 











                                        





No Known Home Medications  05/17/21 








Allergies/Adverse Reactions: 


                                    Allergies











Allergy/AdvReac Type Severity Reaction Status Date / Time


 


No Known Drug Allergies Allergy   Verified 03/15/21 18:21














Anes History & Medical History





- Anesthetic History


Anesthesia Complications: reports: No previous complications


Family history of Anesthesia Complications: Denies


Family history of Malignant Hyperthermia: Denies





- Medical History


Cardiovascular: reports: None


Pulmonary: reports: None


Gastrointestinal: reports: None


Urinary: reports: Chronic bladder infection


Musculoskeletal: reports: None


Endocrine/Autoimmune: reports: None


Skin: reports: None


Smoking Status: Never smoker





Exam


General: Alert, Oriented x3, Cooperative


Dental: WNL


Mouth Opening: 3 Fingerbreadth


Neck Mobility: Normal


Mallampati classification: II


Thyromental Distance: 4-6 cm


Respiratory: Lungs clear, Normal breath sounds, No respiratory distress


Cardiovascular: Regular rate


Neurological: Normal speech


Mental/Cognitive Status: Alert/Oriented X3, Normal for patient


Cognitive Status: Within normal limits





Plan


Anesthesia Type: General


Consent for Procedure(s) Verified and Reviewed: Yes


Code Status: Attempt Resuscitation


ASA classification: 1-Healthy patient


Is this case an emergency?: No